# Patient Record
Sex: MALE | Race: OTHER | HISPANIC OR LATINO | URBAN - METROPOLITAN AREA
[De-identification: names, ages, dates, MRNs, and addresses within clinical notes are randomized per-mention and may not be internally consistent; named-entity substitution may affect disease eponyms.]

---

## 2020-12-24 ENCOUNTER — EMERGENCY (EMERGENCY)
Facility: HOSPITAL | Age: 40
LOS: 1 days | Discharge: HOME | End: 2020-12-24
Attending: EMERGENCY MEDICINE
Payer: MEDICAID

## 2020-12-24 VITALS
SYSTOLIC BLOOD PRESSURE: 152 MMHG | HEART RATE: 67 BPM | RESPIRATION RATE: 16 BRPM | TEMPERATURE: 98 F | WEIGHT: 214.95 LBS | HEIGHT: 66 IN | DIASTOLIC BLOOD PRESSURE: 91 MMHG | OXYGEN SATURATION: 97 %

## 2020-12-24 DIAGNOSIS — Z48.01 ENCOUNTER FOR CHANGE OR REMOVAL OF SURGICAL WOUND DRESSING: ICD-10-CM

## 2020-12-24 PROCEDURE — G0463: CPT

## 2020-12-24 PROCEDURE — L9995: CPT

## 2020-12-24 NOTE — ED PROVIDER NOTE - CLINICAL SUMMARY MEDICAL DECISION MAKING FREE TEXT BOX
brock pgy3: 39 yo m s/p thumb avulsion fx and repair presents for wound check. pt w/ intact wound w/ no evidence of dehiscence or infection at this time. pt to f/u w/ plastics. neurovascularly intact. provided teachback. received ibuprofen and augmentin from OSH, pt complaint w/ meds. brock pgy3: 41 yo m s/p thumb avulsion fx and repair presents for wound check. pt w/ intact wound w/ no evidence of dehiscence or infection at this time. pt to f/u w/ plastics. neurovascularly intact. provided teachback. received ibuprofen and augmentin from OSH, pt complaint w/ meds.  Raisa: 39yo who had thumb partial amputation. Had it closed at [facility]. no pus. no redness, no fever. does not need acute care but needs long tem follow up with plastics/hand

## 2020-12-24 NOTE — ED PROVIDER NOTE - CARE PROVIDER_API CALL
Antonio Irizarry J  PLASTIC SURGERY  [address]  [place] [address]  Phone: [phone]  Fax: [phone]  Follow Up Time:

## 2020-12-24 NOTE — ED PROVIDER NOTE - PATIENT PORTAL LINK FT
You can access the FollowMyHealth Patient Portal offered by [facility] by registering at the following website: [email or web address]. By joining [employer]’s FollowMyHealth portal, you will also be able to view your health information using other applications (apps) compatible with our system.

## 2020-12-24 NOTE — ED PROVIDER NOTE - PHYSICAL EXAMINATION
General: nad  HEENT: EOMI, PERRLA, normal mucosa, normal oropharynx, no lesions on the lips or on oral mucosa, normal external ear  Neck: supple, no lymphadenopathy, full range of motion, no nuchal rigidity  CV: RRR, radial pulse 2+ b/l  Resp: non labored breathing  Abd: non-distended  MSK: full range of motion, distal thumb wound closed w/ sutures w/ no active drainage, sensation intact in thumb  Neuro: CN II-XII grossly intact

## 2020-12-24 NOTE — ED PROVIDER NOTE - NSFOLLOWUPINSTRUCTIONS_ED_ALL_ED_FT
1) Please follow up with your Primary Care Provider in 24-48 hours  2) Seek immediate medical care for any new or returning symptoms including but not limited severe pain, high fevers, draining from wound, spontaneous opening of wound  3) Please continue taking medications prescribed

## 2020-12-24 NOTE — ED PROVIDER NOTE - ATTENDING CONTRIBUTION TO CARE
I performed a history and physical exam of the patient and discussed their management with the resident and /or advanced care provider. I reviewed the resident and /or ACP's note and agree with the documented findings and plan of care. My medical decision making and observations are found above.  lungs clear, thumb shortened, no cellulitis

## 2020-12-24 NOTE — ED PROVIDER NOTE - NSFOLLOWUPCLINICS_GEN_ALL_ED_FT
[facility]  Plastic Surgery  [address]  [place] [address]  Phone: [phone]  Fax:   Follow Up Time:

## 2020-12-24 NOTE — ED ADULT NURSE NOTE - OBJECTIVE STATEMENT
39yo M with no PMH. states he needs help with getting to a hand specialist. sliced his thumb on monday at work, went to an outside hospital for care where wound was addressed and stitched. returns today stating he needs help with getting referral to a hand specialist. area is not swollen, oozing or aggravated in appearance. digit is mobile, no loss of sensation. denies fevers chills or any other complaints. well appearing otherwise. MD is at bedside

## 2020-12-24 NOTE — ED PROVIDER NOTE - OBJECTIVE STATEMENT
39 yo m no reported pmh presents for wound check. on monday 12/21 pt had workplace injury w/ saw. suffered left thumb dip avulsion fx, area w 41 yo m no reported pmh presents for wound check. on monday 12/21 pt had workplace injury w/ saw. suffered left thumb dip avulsion fx. wound repaired at OSH. pt unable to obtain follow up yet. denies f/c, drainage, dehiscence. denies n/v.

## 2020-12-24 NOTE — ED PROVIDER NOTE - NS ED ROS FT
GENERAL: No fever or chills, //             EYES: no change in vision, //             HEENT: no trouble swallowing or speaking, //             CARDIAC: no chest pain, //              PULMONARY: no cough or SOB, //             GI: no abdominal pain, no nausea or no vomiting, no diarrhea or constipation, //             : No changes in urination,  //            SKIN: wound   //            NEURO: no headache,  //             MSK: No joint pain otherwise as HPI or negative. ~Cortes Rivera DO PGY3

## 2023-03-01 ENCOUNTER — EMERGENCY (EMERGENCY)
Facility: HOSPITAL | Age: 43
LOS: 1 days | Discharge: HOME | End: 2023-03-01
Attending: EMERGENCY MEDICINE
Payer: MEDICAID

## 2023-03-01 VITALS
TEMPERATURE: 98 F | HEART RATE: 64 BPM | HEIGHT: 67 IN | WEIGHT: 210.98 LBS | SYSTOLIC BLOOD PRESSURE: 132 MMHG | DIASTOLIC BLOOD PRESSURE: 82 MMHG | RESPIRATION RATE: 16 BRPM | OXYGEN SATURATION: 97 %

## 2023-03-01 LAB
APPEARANCE UR: CLEAR — SIGNIFICANT CHANGE UP
BILIRUB UR-MCNC: NEGATIVE — SIGNIFICANT CHANGE UP
COLOR SPEC: YELLOW — SIGNIFICANT CHANGE UP
DIFF PNL FLD: NEGATIVE — SIGNIFICANT CHANGE UP
GLUCOSE UR QL: NEGATIVE — SIGNIFICANT CHANGE UP
KETONES UR-MCNC: NEGATIVE — SIGNIFICANT CHANGE UP
LEUKOCYTE ESTERASE UR-ACNC: NEGATIVE — SIGNIFICANT CHANGE UP
NITRITE UR-MCNC: NEGATIVE — SIGNIFICANT CHANGE UP
PH UR: 6.5 — SIGNIFICANT CHANGE UP (ref 5–8)
PROT UR-MCNC: NEGATIVE — SIGNIFICANT CHANGE UP
SP GR SPEC: 1.01 — SIGNIFICANT CHANGE UP (ref 1.01–1.02)
UROBILINOGEN FLD QL: NEGATIVE — SIGNIFICANT CHANGE UP

## 2023-03-01 PROCEDURE — 87491 CHLMYD TRACH DNA AMP PROBE: CPT

## 2023-03-01 PROCEDURE — 99284 EMERGENCY DEPT VISIT MOD MDM: CPT

## 2023-03-01 PROCEDURE — 81003 URINALYSIS AUTO W/O SCOPE: CPT

## 2023-03-01 PROCEDURE — 87086 URINE CULTURE/COLONY COUNT: CPT

## 2023-03-01 PROCEDURE — 76870 US EXAM SCROTUM: CPT

## 2023-03-01 PROCEDURE — 87591 N.GONORRHOEAE DNA AMP PROB: CPT

## 2023-03-01 PROCEDURE — 76870 US EXAM SCROTUM: CPT | Mod: 26

## 2023-03-01 RX ORDER — IBUPROFEN 200 MG
600 TABLET ORAL ONCE
Refills: 0 | Status: COMPLETED | OUTPATIENT
Start: 2023-03-01 | End: 2023-03-01

## 2023-03-01 RX ADMIN — Medication 600 MILLIGRAM(S): at 17:17

## 2023-03-01 NOTE — ED PROVIDER NOTE - NSFOLLOWUPINSTRUCTIONS_ED_ALL_ED_FT
continue with plan of care by your urologist. motrin 600mg every 6 hrs for pain as needed.     Continúe con el plan de atención de marte urólogo. motrin 600 mg cada 6 horas para el dolor según sea necesario.

## 2023-03-01 NOTE — ED PROVIDER NOTE - PATIENT PORTAL LINK FT
You can access the FollowMyHealth Patient Portal offered by [facility] by registering at the following website: [email or web address]. By joining [employer]’s FollowMyHealth portal, you will also be able to view your health information using other applications (apps) compatible with our system.

## 2023-03-01 NOTE — ED ADULT NURSE NOTE - OBJECTIVE STATEMENT
pt  is a 43 y/o  male  with  c/o   rt  groin pain  last  month  now  pt  states pain  is  worse  for  the  past  6-7  days. denies any  nausea  and  vomiting.

## 2023-03-01 NOTE — ED PROVIDER NOTE - PROGRESS NOTE DETAILS
Magana: work up no findings. continue with plan of care by your urologist. motrin 600mg every 6 hrs for pain as needed.

## 2023-03-01 NOTE — ED PROVIDER NOTE - CLINICAL SUMMARY MEDICAL DECISION MAKING FREE TEXT BOX
42 yr old male with no hx presents to ed c/o testicular pain x 1 month went to [facility] and had sono then went to urologist who performed own sono and saw nothing but gave pt doxy and cipro for 2 weeks. pt still having discomfort- points to left groin area. denies any fever, n/v, penile discharge, trauma, dysuria. normal BM    pt with likely epididymitis r/o uti vs sti - ua, sono, motrin, continue with urology follow up and abx

## 2023-03-01 NOTE — ED PROVIDER NOTE - OBJECTIVE STATEMENT
42 yr old male with no hx presents to ed c/o testicular pain x 1 month went to [facility] and had sono then went to urologist who performed own sono and saw nothing but gave pt doxy and cipro for 2 weeks. pt still having discomfort- points to left groin area. denies any fever, n/v, penile discharge, trauma, dysuria. normal BM

## 2023-03-01 NOTE — ED ADULT TRIAGE NOTE - CHIEF COMPLAINT QUOTE
C/o Right groin pain last month now left groin pain 6-7 days. I was given levofloxacin, doxycycline, metoclopramide  by urology last month for infection but I still have the pain.

## 2023-03-02 LAB
C TRACH RRNA SPEC QL NAA+PROBE: SIGNIFICANT CHANGE UP
CULTURE RESULTS: NO GROWTH — SIGNIFICANT CHANGE UP
N GONORRHOEA RRNA SPEC QL NAA+PROBE: SIGNIFICANT CHANGE UP
SPECIMEN SOURCE: SIGNIFICANT CHANGE UP
SPECIMEN SOURCE: SIGNIFICANT CHANGE UP

## 2023-08-08 NOTE — ED ADULT TRIAGE NOTE - HEIGHT IN CM
170.18 Protopic Pregnancy And Lactation Text: This medication is Pregnancy Category C. It is unknown if this medication is excreted in breast milk when applied topically.

## 2023-08-23 NOTE — ED ADULT NURSE NOTE - NSIMPLEMENTINTERV_GEN_ALL_ED
Implemented All Universal Safety Interventions:  [place] to call system. Call bell, personal items and telephone within reach. Instruct patient to call for assistance. Room bathroom lighting operational. Non-slip footwear when patient is off stretcher. Physically safe environment: no spills, clutter or unnecessary equipment. Stretcher in lowest position, wheels locked, appropriate side rails in place. Zoryve Counseling:  I discussed with the patient that Zoryve is not for use in the eyes, mouth or vagina. The most commonly reported side effects include diarrhea, headache, insomnia, application site pain, upper respiratory tract infections, and urinary tract infections.  All of the patient's questions and concerns were addressed.

## 2025-01-16 NOTE — ED ADULT NURSE NOTE - IN THE PAST 12 MONTHS HAVE YOU USED DRUGS OTHER THAN THOSE REQUIRED FOR MEDICAL REASON?
Patient called.  Informed of the E Consult.  Patient agreed's with E-Consult plan.  No further questions   No